# Patient Record
Sex: FEMALE | Race: WHITE | URBAN - METROPOLITAN AREA
[De-identification: names, ages, dates, MRNs, and addresses within clinical notes are randomized per-mention and may not be internally consistent; named-entity substitution may affect disease eponyms.]

---

## 2017-06-16 ENCOUNTER — HISTORICAL (OUTPATIENT)
Dept: ADMINISTRATIVE | Facility: HOSPITAL | Age: 37
End: 2017-06-16

## 2017-06-16 LAB
ABS NEUT (OLG): 3.03 X10(3)/MCL (ref 2.1–9.2)
APTT PPP: 27.3 SECOND(S) (ref 20.6–36)
B-HCG SERPL QL: NEGATIVE
BASOPHILS # BLD AUTO: 0 X10(3)/MCL (ref 0–0.2)
BASOPHILS NFR BLD AUTO: 0 %
BUN SERPL-MCNC: 7 MG/DL (ref 7–18)
CALCIUM SERPL-MCNC: 8.9 MG/DL (ref 8.5–10.1)
CHLORIDE SERPL-SCNC: 108 MMOL/L (ref 98–107)
CO2 SERPL-SCNC: 25 MMOL/L (ref 21–32)
CREAT SERPL-MCNC: 0.66 MG/DL (ref 0.55–1.02)
EOSINOPHIL # BLD AUTO: 0.2 X10(3)/MCL (ref 0–0.9)
EOSINOPHIL NFR BLD AUTO: 3 %
ERYTHROCYTE [DISTWIDTH] IN BLOOD BY AUTOMATED COUNT: 12.6 % (ref 11.5–17)
GLUCOSE SERPL-MCNC: 85 MG/DL (ref 74–106)
HCT VFR BLD AUTO: 35.6 % (ref 37–47)
HGB BLD-MCNC: 12.2 GM/DL (ref 12–16)
INR PPP: 1 (ref 0–1.27)
LYMPHOCYTES # BLD AUTO: 2.3 X10(3)/MCL (ref 0.6–4.6)
LYMPHOCYTES NFR BLD AUTO: 38 %
MCH RBC QN AUTO: 29.8 PG (ref 27–31)
MCHC RBC AUTO-ENTMCNC: 34.3 GM/DL (ref 33–36)
MCV RBC AUTO: 87 FL (ref 80–94)
MONOCYTES # BLD AUTO: 0.6 X10(3)/MCL (ref 0.1–1.3)
MONOCYTES NFR BLD AUTO: 10 %
NEUTROPHILS # BLD AUTO: 3.03 X10(3)/MCL (ref 2.1–9.2)
NEUTROPHILS NFR BLD AUTO: 49 %
PLATELET # BLD AUTO: 182 X10(3)/MCL (ref 130–400)
PMV BLD AUTO: 10.9 FL (ref 9.4–12.4)
POTASSIUM SERPL-SCNC: 3.9 MMOL/L (ref 3.5–5.1)
PROTHROMBIN TIME: 13 SECOND(S) (ref 12.1–14.2)
RBC # BLD AUTO: 4.09 X10(6)/MCL (ref 4.2–5.4)
SODIUM SERPL-SCNC: 141 MMOL/L (ref 136–145)
WBC # SPEC AUTO: 6.2 X10(3)/MCL (ref 4.5–11.5)

## 2017-07-31 ENCOUNTER — HISTORICAL (OUTPATIENT)
Dept: ADMINISTRATIVE | Facility: HOSPITAL | Age: 37
End: 2017-07-31

## 2017-09-11 ENCOUNTER — HISTORICAL (OUTPATIENT)
Dept: ADMINISTRATIVE | Facility: HOSPITAL | Age: 37
End: 2017-09-11

## 2017-12-12 ENCOUNTER — HISTORICAL (OUTPATIENT)
Dept: ADMINISTRATIVE | Facility: HOSPITAL | Age: 37
End: 2017-12-12

## 2022-04-10 ENCOUNTER — HISTORICAL (OUTPATIENT)
Dept: ADMINISTRATIVE | Facility: HOSPITAL | Age: 42
End: 2022-04-10

## 2022-04-26 VITALS
WEIGHT: 194.25 LBS | HEIGHT: 67 IN | SYSTOLIC BLOOD PRESSURE: 112 MMHG | DIASTOLIC BLOOD PRESSURE: 72 MMHG | BODY MASS INDEX: 30.49 KG/M2

## 2022-04-30 NOTE — OP NOTE
DATE OF SURGERY:    06/16/2017    SURGEON:  Brock Rojo MD    PREOPERATIVE DIAGNOSIS:  Intra-articular right distal radius fracture.    POSTOPERATIVE DIAGNOSIS:  Intra-articular right distal radius fracture.    PROCEDURE:  Open reduction and internal fixation of right intra-articular distal radius fracture of 3 or more fragments.    ASSISTANT:  Dr. Lincoln Rolon who was necessary for a skilled set of hands to assist with reduction of the fracture as well as application of the hardware.    IMPLANTS:  Biomet DVR cross lock distal radius plate.    ANESTHESIA:  General endotracheal anesthesia.    ESTIMATED BLOOD LOSS:  10 cc.    TOURNIQUET TIME:  27 minutes.    COMPLICATIONS:  None.    COUNTS:  All counts correct times two at the end of the case.    INDICATIONS FOR PROCEDURE:  The patient is a 37-year-old female, who had a fall onto her outstretched right upper extremity, sustaining a displaced right intra-articular distal radius fracture.  She was seen and evaluated in the emergency department where they performed a closed reduction and improved her alignment.  However she still had significant dorsal angulation as well as a split into the lunate facet.  The risks, benefits and alternatives to treatment were discussed at length with the patient in the office and she elected to undergo open reduction and internal fixation of her right distal radius fracture.    PROCEDURE IN DETAIL:  After informed consent was obtained, the patient was met in the preoperative holding area and the site was marked.  She was taken to the operating room and placed supine on the operating table.  General endotracheal anesthesia was induced.  The right upper extremity was prepped and draped in a standard sterile fashion.  A timeout was done to indicate the correct operative limb and procedure.  Preoperative antibiotics were given.  The limb was exsanguinated and the tourniquet was raised.  An incision was made over the volar aspect  of the wrist and carried down through the floor of the FCR tendon sheath as it was retracted ulnarward.  The pronator quadratus was lifted off the distal aspect of the radius and the fracture site was evaluated.  With traction and volar flexion and radial deviation the fracture was reduced.  It was held in a reduced position by my assistant.  The plate was positioned.  An oblong hole was placed in the shaft.  It was again positioned distally and confirmed to be in appropriate position and pinned.  A nonlocking screw was placed into the distal segment bringing the plate down to bone, followed by the remaining locking screws.  They were all determined to be of appropriate length and trajectory on multiple fluoroscopic images.  Two further shaft screws were placed proximally.  Final fluoroscopic images showed all screws were in appropriate position.  The fracture was well reduced.  We restored her height, volar tilt and radial inclination.  The tourniquet was released.  Hemostasis was obtained.  The wound was thoroughly irrigated and closed using 2-0 Vicryl and 3-0 nylon.  Xeroform, 4 x 4's, cast padding and a resting volar splint were applied.  The patient was awakened, extubated and taken to recovery in stable condition.    POSTOPERATIVE PLAN:  She will be discharged home today.  She will be nonweightbearing to the right upper extremity.  She will keep the limb elevated, keep her splint clean and dry and follow up in two weeks.        ______________________________  MD LINA Andre/UK  DD:  06/16/2017  Time:  08:24AM  DT:  06/16/2017  Time:  01:35PM  Job #:  [id number]

## 2022-05-02 NOTE — HISTORICAL [facility] CERNER
This is a historical note converted from [facility]. Formatting and pictures may have been removed.  Please reference [facility] for original formatting and attached multimedia. Chief Complaint  3 mos fu rt distal radius ORIF sx  History of Present Illness  Doing very well today.?Has had great improvement in her motion?with physical therapy.?Very happy with the progress she is making.  Review of Systems  Negative  Physical Exam  Vitals & Measurements  BP:?112/72?  HT:?170.18?cm? HT:?170.18?cm? WT:?88.1?kg? WT:?88.1?kg? BMI:?30.42?  Right wrist:?Surgical incision well-healed.?Able to obtain full pronation,?lacking approximately 15? full supination.?Good  strength.?2+ radial pulse.?Intact EPL/FPL, EDC/FDP and interossei.?Sensation light touch in the median/radial/ulnar distributions intact.  Assessment/Plan  Closed fracture of right distal radius  ?  ?  She is doing great today. We will?release her to full range of motion?and weightbearing to the right upper extremity without restrictions. She can continue physical therapy.?Well have her follow-up in 3 months for repeat x-rays and at that time if all looks well well release her to as needed follow-up. She understands and agrees all we have discussed and all questions and concerns were addressed.  ?  ?  Ordered:  Clinic Follow up  Post-Op follow-up visit [id number] PC  PT/OT External Referral  XR Wrist Right 2 Views  ?   Problem List/Past Medical History  Ongoing  Closed fracture of right distal radius  Obesity  Historical  Procedure/Surgical History  Open treatment of distal radial intra-articular fracture or epiphyseal separation; with internal fixation of 3 or more fragments (06/16/2017)  ORIF Radius Distal (Right) (06/16/2017)  Reposition Right Radius with Internal Fixation Device, Open Approach (06/16/2017)  Closed treatment of distal radial fracture (eg, Colles or Smith type) or epiphyseal separation, includes closed treatment of fracture of ulnar styloid, when performed;  with manipulation (06/14/2017)  Moderate sedation services provided by the same physician or other qualified health care professional performing the diagnostic or therapeutic service that the sedation supports, requiring the presence of an independent trained observer to assist in the m (06/14/2017)  Reposition Right Radius, External Approach (06/14/2017)  Application of short leg splint (calf to foot) (02/20/2016)  Immobilization of Right Lower Extremity using Splint (02/20/2016)  lasik  wisdom teeth exraction  Medications  DIAZEPAM 5 MG TABLET,? ?Not taking  ethinyl estradiol-levonorgestrel low dose biphasic extended cycle oral tablet  HYDROCODON-ACETAMINOPHEN 5-325,? ?Not taking  Zofran 4 mg oral tablet, 4 mg= 1 tab(s), Oral, q8hr, PRN,? ?Not taking: not taken yet  Allergies  No Known Medication Allergies  Social History  Alcohol - 02/20/2016  Current, Wine, 1-2 times per week  Substance Abuse - 02/20/2016  Never  Tobacco - 02/20/2016  Never smoker  Family History  Family history is negative  Diagnostic Results  Right wrist 2 views:?All hardware intact. Alignment maintained.?Fracture well-healed.

## 2022-05-02 NOTE — HISTORICAL [facility] CERNER
This is a historical note converted from [facility]. Formatting and pictures may have been removed.  Please reference [facility] for original formatting and attached multimedia. Chief Complaint  6.5 week follow up right distal radius fx, s/p orif, in removable wrist splint. attending therapy  History of Present Illness  Pt now 6.5 weeks s/p right distal radius fracture ORIF. Overall, she is doing much better. Here for x-rays.  Review of Systems  ????Constitutional: Negative.  ????Eye: Negative.  ????Ear/Nose/Mouth/Throat: Negative.  ????Respiratory: Negative.  ????Cardiovascular: Negative.  ????Gastrointestinal: Negative.  ????Genitourinary: Negative.  ????Hematology/Lymphatics: Negative.  ????Endocrine: Negative.  ????Immunologic: Negative.  ????Musculoskeletal: Negative except HPI.  ????Integumentary: Negative.  ????Neurologic: Negative.  ????Psychiatric: Negative.  ????All other systems are negative  Physical Exam  Vitals & Measurements  HR:?73?(Peripheral)? BP:?112/72? HT:?170.18?cm? HT:?170.18?cm? WT:?88.1?kg? WT:?88.1?kg? BMI:?30.42?  ????General-Alert, oriented, no fever, chills  ????HEENT-Normocephalic, EOMI, moist oral mucosa, neck supple and nontender  ????Respiratory-Nonlabored respirations, symmetric chest expansion, chest wall nontender  ????Cardiac-Regular rate and rhythm, Pulses palpable in all extremities, Normal peripheral perfusion  ????Gastrointestinal-Soft, nontender, nondistended  ????Hemo/Lymph-No lymphadenopathy  ????Ntrlrhtdguxfhjd-BJO-Rdvyvobh is well-healed. Good flexion and extension, full pronation, but extremely stiff on supination. NVID. BCR.  ????Neurologic-Alert and oriented x4, cooperative  ????Dermatologic-Skin warm, pink, dry.  Assessment/Plan  Closed fracture of right distal radius  ?  Discussed the importance of aggressive ROM to right wrist to normalize her wrist function. Continue PT. 10 pount WB limit. Full ROM. OK to wear brace at night or when driving, but out of brace for all other  times.  ?  Ordered:  Clinic Follow up, *Est. 09/11/17 3:00:00 CDT, Order for future visit, Closed fracture of right distal radius, [facility]  Post-Op follow-up visit [id number] PC, Closed fracture of right distal radius, [facility], 07/31/17 8:50:00 CDT  PT/OT External Referral, 07/31/17 8:50:00 CDT, Closed fracture of right distal radius, Evaluate and Treat, 3 X Week, Standard Precautions, RUE-10 pound WB limit; Aggressive ROM, stretching to right forearm, wrist, digits. All modalities welcomed.  XR Wrist Right 2 Views, Routine, *Est. 09/11/17 3:00:00 CDT, Follow Up Trauma, None, Ambulatory, Rad Type, Order for future visit, Closed fracture of right distal radius, Not Scheduled, 6, week(s), In Approximately, *Est. 09/11/17 3:00:00 CDT  ?   Problem List/Past Medical History  Closed fracture of right distal radius  Obesity  Historical  No historical problems  Procedure/Surgical History  Open treatment of distal radial intra-articular fracture or epiphyseal separation; with internal fixation of 3 or more fragments (06/16/2017), ORIF Radius Distal (Right) (06/16/2017), Reposition Right Radius with Internal Fixation Device, Open Approach (06/16/2017), Closed treatment of distal radial fracture (eg, Colles or Smith type) or epiphyseal separation, includes closed treatment of fracture of ulnar styloid, when performed; with manipulation (06/14/2017), Moderate sedation services provided by the same physician or other qualified health care professional performing the diagnostic or therapeutic service that the sedation supports, requiring the presence of an independent trained observer to assist in the m (06/14/2017), Reposition Right Radius, External Approach (06/14/2017), Application of short leg splint (calf to foot) (02/20/2016), Immobilization of Right Lower Extremity using Splint (02/20/2016), lasik, wisdom teeth exraction.  Medications  DIAZEPAM 5 MG TABLET,? ?Not taking  ethinyl  estradiol-levonorgestrel low dose biphasic extended cycle oral tablet  HYDROCODON-ACETAMINOPHEN 5-325,? ?Not taking  Norco 5 mg-325 mg oral tablet, 1 tab(s), Oral, TID, PRN,? ?Not taking  Zofran 4 mg oral tablet, 4 mg, 1 tab(s), Oral, q8hr, PRN,? ?Not taking: not taken yet  Allergies  No Known Medication Allergies  Social History  Alcohol  Current, Wine, 1-2 times per week  Substance Abuse  Never  Tobacco  Never smoker  Diagnostic Results  X-ray right wrist shows anatomic alignment, intact hardware, interval healing of fracture site.      Patient evaluated and discussed with Brandon Roman NP. I agree with his assessment and plan of care with any exceptions or additions noted. Needs to work on ROM of wrist and forearm. Stiffness noted with supination, only gets 15-20 degrees. Continue PT, work on ROM. Follow up in 6 weeks for repeat films.

## 2022-05-02 NOTE — HISTORICAL [facility] CERNER
This is a historical note converted from [facility]. Formatting and pictures may have been removed.  Please reference [facility] for original formatting and attached multimedia. Chief Complaint  6 month follow up right distal radius fx, s/p ORIF  History of Present Illness  Pt now 6 months s/p right distal radius ORIF. Doing very well overall. Here for final x-rays. She has returned to normal activities. She will be moving to [place] soon for work. She participates in spin class. Minor aches and pains when she lifts objects suddenly.  Review of Systems  Review of Systems?  ????Constitutional: ?Negative. ?  ????Eye: ?Negative. ?  ????Ear/Nose/Mouth/Throat: ?Negative. ?  ????Respiratory: ?Negative. ?  ????Cardiovascular: ?Negative. ?  ????Gastrointestinal: ?Negative. ?  ????Genitourinary: ?Negative. ?  ????Hematology/Lymphatics: ?Negative. ?  ????Endocrine: ?Negative. ?  ????Immunologic: ?Negative. ?  ????Musculoskeletal: ?Negative except HPI. ?  ????Integumentary: ?Negative. ?  ????Neurologic: ?Negative. ?  ????Psychiatric: ?Negative. ?  ????All other systems are negative  Physical Exam  Vitals & Measurements  HR:?73?(Peripheral)? RR:?20? BP:?112/72?  HT:?170.18?cm? HT:?170.18?cm? WT:?88.1?kg? WT:?88.1?kg? BMI:?30.42?  ????General-Alert, oriented, no fever, chills  ????HEENT-Normocephalic, EOMI, moist oral mucosa, neck supple and nontender  ????Respiratory-Nonlabored respirations, symmetric chest expansion, chest wall nontender  ????Cardiac-Regular rate and rhythm, Pulses palpable in all extremities, Normal peripheral perfusion  ????Gastrointestinal-Soft, nontender, nondistended  ????Hemo/Lymph-No lymphadenopathy  ????Fykigeohmmjbtxh-MAM-Rxvtxury is well-healed.?FROM wrist, digits. NVID. BCR. Good  strength.  ????Neurologic-Alert and oriented x4, cooperative  ????Dermatologic-Skin warm, pink, dry.  Assessment/Plan  Closed fracture of right distal radius  Ordered:  Office/Outpatient Visit Level 3 Established 09360 ,  Closed fracture of right distal radius, [facility], 12/12/17 8:35:00 CST  ?  Orders:  Clinic Follow-up PRN, 12/12/17 8:35:00 CST, Future Order, [facility]  ?  Continue FWBAT RUE. No restrictions. F/u PRN new/worsening orthopedic problems.   Problem List/Past Medical History  Ongoing  Closed fracture of right distal radius  Obesity  Historical  Procedure/Surgical History  Open treatment of distal radial intra-articular fracture or epiphyseal separation; with internal fixation of 3 or more fragments (06/16/2017)  ORIF Radius Distal (Right) (06/16/2017)  Reposition Right Radius with Internal Fixation Device, Open Approach (06/16/2017)  Closed treatment of distal radial fracture (eg, Colles or Smith type) or epiphyseal separation, includes closed treatment of fracture of ulnar styloid, when performed; with manipulation (06/14/2017)  Moderate sedation services provided by the same physician or other qualified health care professional performing the diagnostic or therapeutic service that the sedation supports, requiring the presence of an independent trained observer to assist in the m (06/14/2017)  Reposition Right Radius, External Approach (06/14/2017)  Application of short leg splint (calf to foot) (02/20/2016)  Immobilization of Right Lower Extremity using Splint (02/20/2016)  lasik  wisdom teeth exraction  Medications  DIAZEPAM 5 MG TABLET,? ?Not taking  ethinyl estradiol-levonorgestrel low dose biphasic extended cycle oral tablet  HYDROCODON-ACETAMINOPHEN 5-325,? ?Not taking  LO LOESTRIN FE 1-10 TABLET, 1 tab(s), Oral, Daily  Zofran 4 mg oral tablet, 4 mg= 1 tab(s), Oral, q8hr, PRN,? ?Not taking: not taken yet  Allergies  No Known Medication Allergies  Social History  Alcohol - 02/20/2016  Current, Wine, 1-2 times per week  Substance Abuse - 02/20/2016  Never  Tobacco - 02/20/2016  Never smoker  Family History  Family history is negative  Diagnostic Results  X-ray right wrist shows anatomic  alignment, intact hardware, fracture completely healed.      Patient evaluated and discussed with Brandon Roman NP. I agree with his assessment and plan of care with any exceptions or additions noted.